# Patient Record
Sex: FEMALE | Race: OTHER | HISPANIC OR LATINO
[De-identification: names, ages, dates, MRNs, and addresses within clinical notes are randomized per-mention and may not be internally consistent; named-entity substitution may affect disease eponyms.]

---

## 2022-02-15 ENCOUNTER — RESULT REVIEW (OUTPATIENT)
Age: 67
End: 2022-02-15

## 2022-09-03 ENCOUNTER — EMERGENCY (EMERGENCY)
Facility: HOSPITAL | Age: 67
LOS: 1 days | Discharge: HOME | End: 2022-09-03
Attending: EMERGENCY MEDICINE
Payer: MEDICARE

## 2022-09-03 VITALS
HEART RATE: 85 BPM | RESPIRATION RATE: 18 BRPM | OXYGEN SATURATION: 98 % | TEMPERATURE: 98 F | SYSTOLIC BLOOD PRESSURE: 150 MMHG | DIASTOLIC BLOOD PRESSURE: 90 MMHG

## 2022-09-03 VITALS
SYSTOLIC BLOOD PRESSURE: 161 MMHG | OXYGEN SATURATION: 97 % | RESPIRATION RATE: 19 BRPM | TEMPERATURE: 98 F | WEIGHT: 210.1 LBS | HEART RATE: 97 BPM | DIASTOLIC BLOOD PRESSURE: 97 MMHG | HEIGHT: 63 IN

## 2022-09-03 PROCEDURE — 99284 EMERGENCY DEPT VISIT MOD MDM: CPT | Mod: 25

## 2022-09-03 PROCEDURE — 73110 X-RAY EXAM OF WRIST: CPT

## 2022-09-03 PROCEDURE — 73080 X-RAY EXAM OF ELBOW: CPT | Mod: 26,RT

## 2022-09-03 PROCEDURE — 73110 X-RAY EXAM OF WRIST: CPT | Mod: 26,RT

## 2022-09-03 PROCEDURE — 29125 APPL SHORT ARM SPLINT STATIC: CPT

## 2022-09-03 PROCEDURE — 29125 APPL SHORT ARM SPLINT STATIC: CPT | Mod: RT

## 2022-09-03 PROCEDURE — 73080 X-RAY EXAM OF ELBOW: CPT

## 2022-09-03 RX ORDER — OXYCODONE AND ACETAMINOPHEN 5; 325 MG/1; MG/1
1 TABLET ORAL
Qty: 12 | Refills: 0
Start: 2022-09-03

## 2022-09-03 RX ORDER — OXYCODONE AND ACETAMINOPHEN 5; 325 MG/1; MG/1
1 TABLET ORAL ONCE
Refills: 0 | Status: DISCONTINUED | OUTPATIENT
Start: 2022-09-03 | End: 2022-09-03

## 2022-09-03 RX ADMIN — OXYCODONE AND ACETAMINOPHEN 1 TABLET(S): 5; 325 TABLET ORAL at 10:21

## 2022-09-03 RX ADMIN — OXYCODONE AND ACETAMINOPHEN 1 TABLET(S): 5; 325 TABLET ORAL at 10:51

## 2022-09-03 NOTE — ED ADULT NURSE NOTE - NSIMPLEMENTINTERV_GEN_ALL_ED
Implemented All Fall Risk Interventions:  [place] to call system. Call bell, personal items and telephone within reach. Instruct patient to call for assistance. Room bathroom lighting operational. Non-slip footwear when patient is off stretcher. Physically safe environment: no spills, clutter or unnecessary equipment. Stretcher in lowest position, wheels locked, appropriate side rails in place. Provide visual cue, wrist band, yellow gown, etc. Monitor gait and stability. Monitor for mental status changes and reorient to person, place, and time. Review medications for side effects contributing to fall risk. Reinforce activity limits and safety measures with patient and family.

## 2022-09-03 NOTE — ED PROVIDER NOTE - OBJECTIVE STATEMENT
66 yo female p/w right wrist pain s/p trip and fall onto outstretched hand pta. C/o throbbing pain and swelling to wrist. H/o fx to left wrist in past tx with cast. No head injury, dizziness or weakness.

## 2022-09-03 NOTE — ED PROVIDER NOTE - NSFOLLOWUPINSTRUCTIONS_ED_ALL_ED_FT
Fractura de Colles    Colles Fracture       La fractura de Colles es un tipo de fractura de la david. Significa que el radio está fracturado o fisurado cerca de la articulación de la david. El radio es lisa de los dos huesos del antebrazo. Está del mismo lado del pulgar. El otro hueso del antebrazo se denomina cúbito. Con frecuencia, cuando kailey persona tiene kailey fractura de Colles, el cúbito también se fractura.    A medida que esta lesión se jem, se usa kailey férula o un yeso para evitar que el hueso lesionado se mueva (para mantenerlo inmovilizado).      ¿Cuáles son las causas?    Las causas frecuentes de bry tipo de fractura incluyen lo siguiente:  •Un golpe joann y directo en la david.      •Lesiones, michel un choque automovilístico o kailey caída sobre kailey mano extendida.        ¿Qué incrementa el riesgo?    Los siguientes factores pueden hacer que sea más propenso a desarrollar esta afección:  •Hacer deportes de contacto o de alto riesgo, michel esquí, ciclismo o patinaje sobre hielo.      •Fumar.      •Consumir más de charan bebidas alcohólicas por día.      •Tener baja densidad ósea o disminución de la densidad ósea (osteoporosis u osteopenia).      •Ser un adulto mayor. Los niños pequeños también tienen un mayor riesgo.      •Ser kailey katarina menopáusica.      •Tener antecedentes de fracturas óseas previas.      •No ingerir suficiente (tener kailey deficiencia de) calcio o vitamina D.        ¿Cuáles son los signos o síntomas?    Los síntomas de esta afección incluyen:  •Dolor a la palpación, hematomas e hinchazón en el lugar de la fractura, que generalmente se encuentra cerca de la david.      •La david colgando en kailey posición extraña o con un aspecto deforme (deformada).      •Dificultad para  la david.        ¿Cómo se diagnostica?    Esta afección se puede diagnosticar en función de lo siguiente:  •Un examen físico.      •Los síntomas y los antecedentes médicos.      •Kailey radiografía del antebrazo y la david.        ¿Cómo se trata?    El tratamiento depende de muchos factores, por ejemplo, la edad, el nivel de actividad y la gravedad de la fractura. El tratamiento puede incluir:  •Inmovilizar la david con kailey férula o un yeso al varias semanas. Antes de colocar kailey férula o un yeso en el brazo, el médico puede  y volver a colocar el hueso o los huesos fracturados en ricks lugar (realineación).      •Cirugía. Esta puede realizarse si el hueso está completamente fuera de lugar (desplazado). Se pueden usar clavos de metal u otros dispositivos para ayudar a sostener el hueso en ricks lugar mientras se consolida. Después de la cirugía, se coloca kailey férula o un yeso en el brazo.      •Fisioterapia.      •Yumi analgésicos.        Siga estas instrucciones en ricks casa:    Si tiene kailey férula:     •Use la férula imchel se lo haya indicado el médico. Quítesela solamente michel se lo haya indicado el médico.      •Afloje la férula si los dedos se le adormecen, siente hormigueo o se le enfrían y se tornan azulados.      •Mantenga la férula limpia.    •Si la férula no es impermeable:  •No deje que se moje.      •Cúbrala con un envoltorio hermético cuando tome un baño de inmersión o kailey ducha.        Si tiene un yeso:     • No introduzca nada dentro del yeso para rascarse la piel. [place] puede aumentar el riesgo de contraer kailey infección.      •Controle la piel de alrededor del yeso todos los días. Informe al médico acerca de cualquier inquietud.      •Puede aplicar kailey loción en la piel seca alrededor de los bordes del yeso. No aplique loción en la piel por debajo del yeso.      •Mantenga el yeso limpio.    •Si el yeso no es impermeable:   •No deje que se moje.       •Cúbralo con un envoltorio hermético cuando tome un baño de inmersión o kailey ducha.          Control del dolor, la rigidez y la hinchazón    •Si se lo indican, aplique hielo sobre la nirav de la lesión. Para hacer esto:  •Si tiene kailey férula desmontable, quítesela michel se lo haya indicado el médico.      •Ponga el hielo en kailey bolsa plástica.      •Coloque kailey toalla entre la piel y la bolsa o entre el yeso y la bolsa.      •Aplique el hielo la 20 minutos, 2 o 3 veces por día.      •Retire el hielo si la piel se pone de color bell brillante. [place] es muy importante. Si no puede sentir dolor, calor o frío, tiene un mayor riesgo de que se dañe la nirav.        •Mueva los dedos con frecuencia para reducir la rigidez y la hinchazón.      •Cuando esté sentado o acostado, levante (eleve) la david por encima del nivel del corazón.      Actividad     • No levante ningún objeto que pese más de 10 libras (4.5 kg) o que supere el límite de peso que le hayan indicado, hasta que el médico le diga que puede hacerlo.      • No use el brazo para apoyar el peso del cuerpo hasta que el médico lo autorice.      •Pregúntele al médico cuándo puede volver a conducir si tiene kailey férula o un yeso en el brazo.      •Retome katrina actividades normales según lo indicado por el médico. Pregúntele al médico qué actividades son seguras para usted.      •Si le indicaron fisioterapia, sandi los ejercicios michel se lo haya indicado el médico.      Medicamentos     •Use los medicamentos de venta bret y los recetados solamente michel se lo haya indicado el médico.    •Pregúntele al médico si el medicamento recetado:  •Hace necesario que evite conducir o usar maquinaria.    •Puede causarle estreñimiento. Es posible que tenga que yumi estas medidas para prevenir o tratar el estreñimiento:  •Beber suficiente líquido michel para mantener la orina de color amarillo pálido.      •Usar medicamentos recetados o de venta bret.      •Consumir alimentos ricos en fibra, michel frijoles, cereales integrales, y frutas y verduras frescas.      •Limitar el consumo de alimentos ricos en grasa y azúcares procesados, michel los alimentos fritos o dulces.          Indicaciones generales     • No ejerza presión en ninguna parte de la férula o del yeso hasta que se haya endurecido por completo. [place] puede tardar varias horas.      • No consuma ningún producto que contenga nicotina o tabaco, michel cigarrillos, cigarrillos electrónicos y tabaco de mascar. Estos pueden retrasar la consolidación del hueso. Si necesita ayuda para dejar de consumir estos productos, consulte al médico.      •Cumpla con todas las visitas de seguimiento. [place] es importante.        Comuníquese con un médico si:  •La férula o el yeso:  •Se mojan o dañan.      •De repente se sienten demasiado ajustados.        •Tiene fiebre o escalofríos.      •Ricks dolor no se el con medicamentos.      •Tiene kailey hinchazón que empeora.        Solicite ayuda de inmediato si:    •La mano o los dedos se entumecen o se ponen fríos, azules o grises.      •Tiene adormecimiento u hormigueos en los dedos, incluso después de aflojar la férula (si corresponde).        Resumen    •La fractura de Colles es un tipo de fractura de la david. A menudo incluye ambos huesos del antebrazo (radio y cúbito).      •Las fracturas son frecuentes en las personas que practican deportes de contacto o deportes de alto riesgo. También son frecuentes en las personas mayores que están en riesgo de sufrir osteoporosis.      •Esta lesión se diagnostica mediante un examen físico y radiografías.      •La david deberá mantenerse en ricks lugar (inmovilizada) con kailey férula o un yeso al varias semanas. Puede necesitar kailey cirugía para kailey fractura más grave.      Esta información no tiene michel fin reemplazar el consejo del médico. Asegúrese de hacerle al médico cualquier pregunta que tenga.        Cuidados de kailey férula    LO QUE NECESITA SABER:    El cuidado de kailey férula es importante para ayudar a proteger ricks férula hasta que se la quiten. Algunas férulas están hechas de fibra de isamar o yeso que tienen que secarse y endurecerse. Cuidar de ricks férula facilitará que ésta se seque y endurezca correctamente. Ricks férula aún puede sufrir daños después de haberse endurecido.    [facility]CCIONES SOBRE [facility]:    Busque atención médica de inmediato si:  •Usted tiene más dolor.      •Katrina dedos de las dustin o dedos de los pies están adormecidos o tienen hormigueo.      •Usted tiene quemazón u hormigueo alrededor de ricks lesión.      •Katrina uñas, dedos de las dustin o dedos de los pies se ponen color татьяна, pálido o lynn y se sienten fríos.      •Usted tiene dificultad para  katrina dedos de las dustin o pies, o la dificultad que ya tenía más ángel empeora.      •Ricks inflamación empeora.      •La piel por debajo de ricks férula está sangrando o botando pus.      Comuníquese con ricks médico si:  •Ricks férula de yeso se moja o se daña.      •Tiene fiebre.      •Ricks férula se siente más apretada o ajustada.      •Tiene picazón o sequedad en la piel bajo ricks férula que está empeorando.      •La piel bajo ricks férula está fabricio o tiene kailey llaga nueva.      •Usted nota un mal olor saliendo de ricks férula.      •Usted tiene preguntas o inquietudes acerca de ricks condición o cuidado.      Cuidado de ricks férula:  •Espere que ricks férula de yeso se endurezca por completo.Es posible que tenga que esperar hasta 3 días antes de poder caminar con kailey férula de yeso.      •Revise ricks férula y la piel a ricks alrededor todos los días.Revise ricks férula en busca de daños, michel grietas y roturas. Revise ricks piel en busca de enrojecimiento, aumento de la inflamación y llagas. Suelte un poco la venda elástica que viene alrededor de ricks férula si se siente muy ajustada.      •Mantenga ricks férula limpia y seca.Asegúrese de que no le entre suciedad a ricks férula. Antes de ducharse, envuelva ricks férula dura con 2 capas de plástico. Luego ponga kailey bolsa de plástico encima de darin. Mantenga la bolsa de plástico cerrada herméticamente. También puede preguntarle a ricks médico acerca de kailey protección a prueba de agua. No ponga ricks férula dura dentro del agua, aun con plástico encima. Kailey férula mojada puede hacer que le pique la piel, y hasta podría contraer kailey infección.      •No ponga polvos ni desodorantes dentro de ricks férula.Éstos podrían resecar ricks piel y aumentar la picazón.      •No trate de rascarse la piel por dentro de rikcs férula de yeso usando objetos puntiagudos.Los objetos puntiagudos podrían romperse dentro de ricks férula o lastimar ricks piel.      •No saque el relleno acolchado de la férula.El relleno dentro de ricks férula protege ricks piel. A usted le podrían salir llagas en ricks piel si se saca el relleno.      Acuda a katrina consultas de control con ricks médico según le indicaron:Anote katrina preguntas para que se acuerde de hacerlas al katrina visitas.       © Copyright [employer] 2022

## 2022-09-03 NOTE — ED ADULT NURSE NOTE - OBJECTIVE STATEMENT
As per pt, c/o R wrist pain w/ deformity noted s/p trip and fall today. No other obvious traumas noted. Pt is able to move all digits on R hand. Pt denies all other symptoms.

## 2022-09-03 NOTE — ED PROVIDER NOTE - CLINICAL SUMMARY MEDICAL DECISION MAKING FREE TEXT BOX
66 yo F with FOOSH rt sided with pain and swelling to distal radius. n/v intact. Suspected fx. Will perform xray wrist and reassess.

## 2022-09-03 NOTE — ED PROVIDER NOTE - PATIENT PORTAL LINK FT
You can access the FollowMyHealth Patient Portal offered by [facility] by registering at the following website: [email or web address]. By joining [facility]’s FollowMyHealth portal, you will also be able to view your health information using other applications (apps) compatible with our system.

## 2022-09-08 ENCOUNTER — EMERGENCY (EMERGENCY)
Facility: HOSPITAL | Age: 67
LOS: 1 days | Discharge: LEFT BEFORE DISCHARGE | End: 2022-09-08
Attending: EMERGENCY MEDICINE

## 2022-09-08 VITALS
SYSTOLIC BLOOD PRESSURE: 129 MMHG | HEIGHT: 63 IN | HEART RATE: 78 BPM | WEIGHT: 210.1 LBS | TEMPERATURE: 98 F | DIASTOLIC BLOOD PRESSURE: 72 MMHG | RESPIRATION RATE: 18 BRPM | OXYGEN SATURATION: 95 %

## 2022-09-08 PROCEDURE — L9991: CPT
